# Patient Record
Sex: MALE | Race: OTHER | NOT HISPANIC OR LATINO | URBAN - METROPOLITAN AREA
[De-identification: names, ages, dates, MRNs, and addresses within clinical notes are randomized per-mention and may not be internally consistent; named-entity substitution may affect disease eponyms.]

---

## 2018-03-21 ENCOUNTER — EMERGENCY (EMERGENCY)
Facility: HOSPITAL | Age: 58
LOS: 1 days | Discharge: HOME | End: 2018-03-21
Attending: EMERGENCY MEDICINE | Admitting: EMERGENCY MEDICINE
Payer: COMMERCIAL

## 2018-03-21 VITALS
TEMPERATURE: 98 F | OXYGEN SATURATION: 100 % | RESPIRATION RATE: 16 BRPM | HEART RATE: 62 BPM | SYSTOLIC BLOOD PRESSURE: 162 MMHG | DIASTOLIC BLOOD PRESSURE: 99 MMHG

## 2018-03-21 PROCEDURE — 99283 EMERGENCY DEPT VISIT LOW MDM: CPT

## 2018-03-21 RX ORDER — MECLIZINE HCL 12.5 MG
25 TABLET ORAL ONCE
Qty: 0 | Refills: 0 | Status: COMPLETED | OUTPATIENT
Start: 2018-03-21 | End: 2018-03-21

## 2018-03-21 RX ORDER — MECLIZINE HCL 12.5 MG
1 TABLET ORAL
Qty: 15 | Refills: 0 | OUTPATIENT
Start: 2018-03-21 | End: 2018-03-25

## 2018-03-21 RX ORDER — MECLIZINE HCL 12.5 MG
1 TABLET ORAL
Qty: 15 | Refills: 0
Start: 2018-03-21 | End: 2018-03-25

## 2018-03-21 RX ORDER — ONDANSETRON 8 MG/1
8 TABLET, FILM COATED ORAL ONCE
Qty: 0 | Refills: 0 | Status: COMPLETED | OUTPATIENT
Start: 2018-03-21 | End: 2018-03-21

## 2018-03-21 RX ADMIN — ONDANSETRON 8 MILLIGRAM(S): 8 TABLET, FILM COATED ORAL at 21:11

## 2018-03-21 RX ADMIN — Medication 25 MILLIGRAM(S): at 20:50

## 2018-03-21 NOTE — ED PROVIDER NOTE - MEDICAL DECISION MAKING DETAILS
59y/o M with room-spinning dizziness. Plan: Meclizine, Zofran, DC with Meclizine prescription and f/u with outpatient neuro.

## 2018-03-21 NOTE — ED PROVIDER NOTE - OBJECTIVE STATEMENT
59y/o M with PMHx of psoriasis presents to the ED s/p 5th episode of acute onset severe dizziness, described as room-spinning with nausea. His sx improve with lying down, worsen with movement of head. He previously had spontaneous resolution, but his sx persisted today. Denies vomiting, diarrhea, chest pain, abdominal pain, saddle anesthesia, neurological deficits, any other complaints. NKDA.

## 2018-03-21 NOTE — ED ADULT TRIAGE NOTE - CHIEF COMPLAINT QUOTE
pt arrives w/ c/o dizziness and vomiting. pt states when he stands up the room spins but when he sits back down it resolves. pt denies any chest pain/sob/fever/chills. pt pmh psoriasis.

## 2018-03-21 NOTE — ED ADULT NURSE NOTE - OBJECTIVE STATEMENT
pt received to room 15, AAOx3, c/o dizziness with positional changes and nausea when the dizziness occurs. pt dizziness is self resolved when laying/sitting back down. pt denies any pain or other complaints. appears comfortable, ambulatory with steady gait. PMH- Psoriasis. VS as noted, pt in NAD, medicated per MD orders, will continue to monitor pt.

## 2018-03-21 NOTE — ED PROVIDER NOTE - CRANIAL NERVE AND PUPILLARY EXAM
cranial nerves 2-12 intact/+Horizontal nystagmus. -Romberg's sign, normal heel-to-shin, normal sterotactic

## 2018-03-21 NOTE — ED PROVIDER NOTE - CHPI ED SYMPTOMS NEG
no numbness/no vomiting/no weakness/no diarrhea, no chest pain, no abdominal pain, no saddle anesthesia

## 2018-04-02 ENCOUNTER — EMERGENCY (EMERGENCY)
Facility: HOSPITAL | Age: 58
LOS: 1 days | Discharge: HOME | End: 2018-04-02
Attending: EMERGENCY MEDICINE | Admitting: EMERGENCY MEDICINE
Payer: COMMERCIAL

## 2018-04-02 VITALS
SYSTOLIC BLOOD PRESSURE: 159 MMHG | OXYGEN SATURATION: 99 % | DIASTOLIC BLOOD PRESSURE: 78 MMHG | HEART RATE: 58 BPM | RESPIRATION RATE: 16 BRPM

## 2018-04-02 VITALS
DIASTOLIC BLOOD PRESSURE: 95 MMHG | TEMPERATURE: 98 F | RESPIRATION RATE: 16 BRPM | SYSTOLIC BLOOD PRESSURE: 140 MMHG | OXYGEN SATURATION: 100 % | HEART RATE: 65 BPM

## 2018-04-02 PROBLEM — L40.9 PSORIASIS, UNSPECIFIED: Chronic | Status: ACTIVE | Noted: 2018-03-21

## 2018-04-02 LAB
ALBUMIN SERPL ELPH-MCNC: 4 G/DL — SIGNIFICANT CHANGE UP (ref 3.3–5)
ALP SERPL-CCNC: 69 U/L — SIGNIFICANT CHANGE UP (ref 40–120)
ALT FLD-CCNC: 78 U/L — HIGH (ref 4–41)
AST SERPL-CCNC: 38 U/L — SIGNIFICANT CHANGE UP (ref 4–40)
BASOPHILS # BLD AUTO: 0.09 K/UL — SIGNIFICANT CHANGE UP (ref 0–0.2)
BASOPHILS NFR BLD AUTO: 0.9 % — SIGNIFICANT CHANGE UP (ref 0–2)
BILIRUB SERPL-MCNC: 0.2 MG/DL — SIGNIFICANT CHANGE UP (ref 0.2–1.2)
BUN SERPL-MCNC: 15 MG/DL — SIGNIFICANT CHANGE UP (ref 7–23)
CALCIUM SERPL-MCNC: 8.8 MG/DL — SIGNIFICANT CHANGE UP (ref 8.4–10.5)
CHLORIDE SERPL-SCNC: 101 MMOL/L — SIGNIFICANT CHANGE UP (ref 98–107)
CO2 SERPL-SCNC: 25 MMOL/L — SIGNIFICANT CHANGE UP (ref 22–31)
CREAT SERPL-MCNC: 0.95 MG/DL — SIGNIFICANT CHANGE UP (ref 0.5–1.3)
EOSINOPHIL # BLD AUTO: 0.3 K/UL — SIGNIFICANT CHANGE UP (ref 0–0.5)
EOSINOPHIL NFR BLD AUTO: 3.1 % — SIGNIFICANT CHANGE UP (ref 0–6)
GLUCOSE SERPL-MCNC: 111 MG/DL — HIGH (ref 70–99)
HCT VFR BLD CALC: 48.2 % — SIGNIFICANT CHANGE UP (ref 39–50)
HGB BLD-MCNC: 15.5 G/DL — SIGNIFICANT CHANGE UP (ref 13–17)
IMM GRANULOCYTES # BLD AUTO: 0.02 # — SIGNIFICANT CHANGE UP
IMM GRANULOCYTES NFR BLD AUTO: 0.2 % — SIGNIFICANT CHANGE UP (ref 0–1.5)
LYMPHOCYTES # BLD AUTO: 2.19 K/UL — SIGNIFICANT CHANGE UP (ref 1–3.3)
LYMPHOCYTES # BLD AUTO: 22.5 % — SIGNIFICANT CHANGE UP (ref 13–44)
MCHC RBC-ENTMCNC: 26.6 PG — LOW (ref 27–34)
MCHC RBC-ENTMCNC: 32.2 % — SIGNIFICANT CHANGE UP (ref 32–36)
MCV RBC AUTO: 82.7 FL — SIGNIFICANT CHANGE UP (ref 80–100)
MONOCYTES # BLD AUTO: 0.56 K/UL — SIGNIFICANT CHANGE UP (ref 0–0.9)
MONOCYTES NFR BLD AUTO: 5.8 % — SIGNIFICANT CHANGE UP (ref 2–14)
NEUTROPHILS # BLD AUTO: 6.57 K/UL — SIGNIFICANT CHANGE UP (ref 1.8–7.4)
NEUTROPHILS NFR BLD AUTO: 67.5 % — SIGNIFICANT CHANGE UP (ref 43–77)
NRBC # FLD: 0 — SIGNIFICANT CHANGE UP
PLATELET # BLD AUTO: 201 K/UL — SIGNIFICANT CHANGE UP (ref 150–400)
PMV BLD: 12.3 FL — SIGNIFICANT CHANGE UP (ref 7–13)
POTASSIUM SERPL-MCNC: 4.6 MMOL/L — SIGNIFICANT CHANGE UP (ref 3.5–5.3)
POTASSIUM SERPL-SCNC: 4.6 MMOL/L — SIGNIFICANT CHANGE UP (ref 3.5–5.3)
PROT SERPL-MCNC: 6.8 G/DL — SIGNIFICANT CHANGE UP (ref 6–8.3)
RBC # BLD: 5.83 M/UL — HIGH (ref 4.2–5.8)
RBC # FLD: 14.2 % — SIGNIFICANT CHANGE UP (ref 10.3–14.5)
SODIUM SERPL-SCNC: 136 MMOL/L — SIGNIFICANT CHANGE UP (ref 135–145)
WBC # BLD: 9.73 K/UL — SIGNIFICANT CHANGE UP (ref 3.8–10.5)
WBC # FLD AUTO: 9.73 K/UL — SIGNIFICANT CHANGE UP (ref 3.8–10.5)

## 2018-04-02 PROCEDURE — 99284 EMERGENCY DEPT VISIT MOD MDM: CPT | Mod: 25

## 2018-04-02 RX ORDER — MECLIZINE HCL 12.5 MG
25 TABLET ORAL ONCE
Qty: 0 | Refills: 0 | Status: COMPLETED | OUTPATIENT
Start: 2018-04-02 | End: 2018-04-02

## 2018-04-02 RX ORDER — SODIUM CHLORIDE 9 MG/ML
1000 INJECTION INTRAMUSCULAR; INTRAVENOUS; SUBCUTANEOUS ONCE
Qty: 0 | Refills: 0 | Status: COMPLETED | OUTPATIENT
Start: 2018-04-02 | End: 2018-04-02

## 2018-04-02 RX ORDER — MECLIZINE HCL 12.5 MG
1 TABLET ORAL
Qty: 15 | Refills: 0
Start: 2018-04-02 | End: 2018-04-06

## 2018-04-02 RX ADMIN — Medication 25 MILLIGRAM(S): at 08:48

## 2018-04-02 RX ADMIN — SODIUM CHLORIDE 1000 MILLILITER(S): 9 INJECTION INTRAMUSCULAR; INTRAVENOUS; SUBCUTANEOUS at 08:48

## 2018-04-02 NOTE — ED ADULT NURSE NOTE - OBJECTIVE STATEMENT
Patient awake and alert, c/o dizziness, worse when he stands up, feels like the room is spinning. Sinus deirdre on the cardiac monitor, no c/o chest pain, no sob. Awaiting MD evaluation.

## 2018-04-02 NOTE — ED PROVIDER NOTE - ATTENDING CONTRIBUTION TO CARE
Cornel DIALLO- 57 Y/O M with h/o psoriasis, recently diagnosed bpv p/w recurrent vertigo which started 1 wk ago and ran out of meclizine and his symptoms recurred. Pt states that vertigo is worse when he stands and has nausea but no vomiting, tinnitus, fever, chills, diarrhea, chest pain, photophobia, focal weakness, numbness or visual changes.Pt complained of midl left sided neck strain    Pt is alert, well appearing male, s1s2 normal reg, b/l clear breath sounds, abd soft, nt, nd, normal bowel sounds, neuro exam aox3, cn 2-12 intact, skin warm, dry, good turgor , neck soft supple, nt, good rom at all joints    Plan to check labs, hydrate, give meclizine and valium

## 2018-04-02 NOTE — ED ADULT NURSE NOTE - CHIEF COMPLAINT QUOTE
Pt arrives to ED c/o dizziness and weakness.  Pt was seen 3/21/18 in [facility] ED for similar complaint and was dx with vertigo.  Pt reports he finished his vertigo medication and does not have a refill.  EKG in triage.

## 2018-04-02 NOTE — ED ADULT TRIAGE NOTE - CHIEF COMPLAINT QUOTE
Pt arrives to ED c/o dizziness and weakness.  Pt was seen 3/21/18 in [facility] ED for similar complaint and was dx with vertigo.  Pt reports he finished his vertigo medication and does not have a refill.  EKG in triage.

## 2018-04-02 NOTE — ED PROVIDER NOTE - MEDICAL DECISION MAKING DETAILS
dizziness likely positional vertigo, will check labs, ivf, symptomatic control, reassess, dc with neuro f/u

## 2018-04-02 NOTE — ED PROVIDER NOTE - OBJECTIVE STATEMENT
57 y/o M with PMH of psoriasis p/w dizziness x 1.5 weeks. Pt reports symptoms worsening with positional changes, improved with meclizine. Pt denies fevers, chills, headache, ear pain, numbness, weakness, vision changes. Pt unable to follow up with neurologist. Pt states that he ran out of the meclizine rx.

## 2020-01-17 VITALS
RESPIRATION RATE: 18 BRPM | WEIGHT: 173.94 LBS | SYSTOLIC BLOOD PRESSURE: 144 MMHG | DIASTOLIC BLOOD PRESSURE: 85 MMHG | HEIGHT: 65 IN | HEART RATE: 54 BPM | OXYGEN SATURATION: 99 % | TEMPERATURE: 97 F

## 2020-01-17 NOTE — H&P ADULT - NSHPSOCIALHISTORY_GEN_ALL_CORE
Former smoker x 46 years, 1ppd quit 2 years ago Former smoker x 46 years, 1ppd quit 2 years ago  Current daily marijuana user  Denies illicit drug use

## 2020-01-17 NOTE — H&P ADULT - RS GEN PE MLT RESP DETAILS PC
respirations non-labored/good air movement/clear to auscultation bilaterally/breath sounds equal/airway patent/normal

## 2020-01-17 NOTE — H&P ADULT - NSICDXPASTMEDICALHX_GEN_ALL_CORE_FT
PAST MEDICAL HISTORY:  Coronary artery disease     History of ST elevation myocardial infarction (STEMI)     Hypertension     Psoriasis PAST MEDICAL HISTORY:  Coronary artery disease     History of ST elevation myocardial infarction (STEMI) 2018    Hypertension     Psoriasis

## 2020-01-17 NOTE — H&P ADULT - BMI (KG/M2)
Consent was obtained from the patient. The risks and benefits to therapy were discussed in detail. Specifically, the risks of infection, scarring, bleeding, prolonged wound healing, incomplete removal, allergy to anesthesia, nerve injury and recurrence were addressed. Prior to the procedure, the treatment site was clearly identified and confirmed by the patient. All components of Universal Protocol/PAUSE Rule completed. Post-Care Instructions: I reviewed with the patient in detail post-care instructions. Patient is to keep the biopsy site dry overnight, and then apply bacitracin twice daily until healed. Patient may apply hydrogen peroxide soaks to remove any crusting. Wound Care: Vaseline Lab: [id number] Size Of Lesion In Cm (Required): 0.4 Anesthesia Volume In Cc: 0.5 Bill 64828 For Specimen Handling/Conveyance To Laboratory?: no Medical Necessity Information: It is in your best interest to select a reason for this procedure from the list below. All of these items fulfill various CMS LCD requirements except the new and changing color options. X Size Of Lesion In Cm (Optional): 0 Lab Facility: [id number] Biopsy Method: Dermablade Detail Level: Detailed Hemostasis: Aluminum Chloride Notification Instructions: Patient will be notified of biopsy results. However, patient instructed to call the office if not contacted within 2 weeks. Billing Type: Third-Party Bill Anesthesia Type: 1% lidocaine without epinephrine Path Notes (To The Dermatopathologist): Please check margins. 24-Dec-2018 28.9

## 2020-01-17 NOTE — H&P ADULT - NSICDXFAMILYHX_GEN_ALL_CORE_FT
FAMILY HISTORY:  FH: coronary artery disease, father FAMILY HISTORY:  FH: coronary artery disease, father and mother

## 2020-01-17 NOTE — H&P ADULT - ASSESSMENT
58 y/o current marijuana user and former smoker male with a FHx of CAD (father and mother) with PMHx of psoriasis, known CAD with [place] [facility] [place] [facility] [place]/p PCI RCA with residual 100% LAD ISR stenosis with left to left and right to left collaterals for which patient underwent Nuclear Viability Study (8/26/2019) revealing mildly decreased uptake in the anteroseptal region both at rest and stress with no significant improvement but had more than 50% uptake at baseline suggestive of a viability now presents for staged PCI of LAD lesion today given patient's known viability in LAD territory and  persistent CCS Angina Class III Symptoms.     -EF 30-35% by Echo (8/2019); euvolemic one exam; NS @ 50 cc/hour initaited  -Hx of DM: ISS ordered  -Remains compliant with ASA 81 mg daily and Plavix 75 mg daily; took both this AM; will give additional  mg x one dose.   -Lab values reviewed and remain stable.     Risks & benefits of procedure and alternative therapy have been explained to the patient including but not limited to: allergic reaction, bleeding w/possible need for blood transfusion, infection, renal and vascular compromise, limb damage, arrhythmia, stroke, vessel dissection/perforation, Myocardial infarction, emergent CABG. Informed consent obtained and in chart. 58 y/o current marijuana user and former smoker male with a FHx of CAD (father and mother) with PMHx of HTN, HLD, ICM (EF 30-35% by Echo 8/2019), psoriasis,  known CAD with prior STEMI s/p PCI LAD 5/1/2018 @ [facility] [place] followed by STEMI 8/25/2019 @ [facility] [place] s/p PCI RCA with residual 100% LAD ISR stenosis with left to left and right to left collaterals for which patient underwent Nuclear Viability Study (8/26/2019) revealing mildly decreased uptake in the anteroseptal region both at rest and stress with no significant improvement but had more than 50% uptake at baseline suggestive of a viability now presents for staged PCI of LAD lesion today given patient's known viability in LAD territory and  persistent CCS Angina Class III Symptoms.     -EF 30-35% by Echo (8/2019); euvolemic one exam; NS @ 50 cc/hour initaited  -Hx of DM: ISS ordered  -Remains compliant with ASA 81 mg daily and Plavix 75 mg daily; took both this AM; will give additional  mg x one dose.   -Lab values reviewed and remain stable.     Risks & benefits of procedure and alternative therapy have been explained to the patient including but not limited to: allergic reaction, bleeding w/possible need for blood transfusion, infection, renal and vascular compromise, limb damage, arrhythmia, stroke, vessel dissection/perforation, Myocardial infarction, emergent CABG. Informed consent obtained and in chart. 58 y/o current marijuana user and former smoker male with a FHx of CAD (father and mother) with PMHx of HTN, HLD, ICM (EF 30-35% by Echo 8/2019), psoriasis,  known CAD with prior STEM[place] [facility] [place] [facility] [place]p [place] RCA with residual 100% LAD ISR stenosis with left to left and right to left collaterals for which patient underwent Nuclear Viability Study (8/26/2019) revealing mildly decreased uptake in the anteroseptal region both at rest and stress with no significant improvement but had more than 50% uptake at baseline suggestive of a viability now presents for staged PCI of LAD lesion today given patient's known viability in LAD territory and  persistent CCS Angina Class III Symptoms.     -EF 30-35% by Echo (8/2019); euvolemic one exam; NS @ 50 cc/hour initaited  -Hx of DM: ISS ordered  -Remains compliant with ASA 81 mg daily and Plavix 75 mg daily; took both this AM; will give additional  mg x one dose.   -Of note: Patient has been prescribed Atorvastatin 80 mg in past; but he self discontinued due to severe foot cramps; Per Dr. Ceja initiate Atorvastatin 40 mg post cath.     Risks & benefits of procedure and alternative therapy have been explained to the patient including but not limited to: allergic reaction, bleeding w/possible need for blood transfusion, infection, renal and vascular compromise, limb damage, arrhythmia, stroke, vessel dissection/perforation, Myocardial infarction, emergent CABG. Informed consent obtained and in chart. 60 y/o current marijuana user and former smoker male with a FHx of CAD (father and mother) with PMHx of HTN, HLD, ICM (EF 30-35% by Echo 8/2019), psoriasis,  known CAD with prior STEMI s/p PCI LAD 5/1/2018 @ [facility] [place] followed by STEMI 8/25/2019 @ [facility] [place] s/p PCI RCA with residual 100% LAD ISR stenosis with left to left and right to left collaterals for which patient underwent Nuclear Viability Study (8/26/2019) revealing mildly decreased uptake in the anteroseptal region both at rest and stress with no significant improvement but had more than 50% uptake at baseline suggestive of a viability now presents for staged PCI of LAD lesion today given patient's known viability and ischemia in LAD territory and  persistent CCS Angina Class III Symptoms.     -EF 30-35% by Echo (8/2019); euvolemic one exam; NS @ 50 cc/hour initaited  -Hx of DM: ISS ordered  -Remains compliant with ASA 81 mg daily and Plavix 75 mg daily; took both this AM; will give additional  mg x one dose.   -Of note: Patient has been prescribed Atorvastatin 80 mg in past; but he self discontinued due to severe foot cramps; Per Dr. Ceja initiate Atorvastatin 40 mg post cath.     Risks & benefits of procedure and alternative therapy have been explained to the patient including but not limited to: allergic reaction, bleeding w/possible need for blood transfusion, infection, renal and vascular compromise, limb damage, arrhythmia, stroke, vessel dissection/perforation, Myocardial infarction, emergent CABG. Informed consent obtained and in chart.

## 2020-01-17 NOTE — H&P ADULT - HISTORY OF PRESENT ILLNESS
Patient is a 60 y/o former smoker male FHx of CAD (father) with PMHx of known CAD/STEMI (s/p stent placements x2), HTN who presented to cardiologist Dr. Ceja    Patient denies CP, SOB, palpitations, syncope, dizziness, PND/orthopnea or LE edema.    Echo 08/26/2019: EF 30-35%, there were regional wall abnormalities involving the septum, apex, and distal inferior walls, mild MR, trace TR. NST 08/26/2019: small myocardial perfusion defect noted at rest. Mildly decreased uptake in the anteroseptal region both at rest and stress with no significant improvement but had more than 50% uptake at baseline suggestive of a viability.     In light of pt's risk factors, known CAD, CCS --- anginal symptoms, patient presents for cardiac catheterization of known  of LAD. Hx obtained from wife Shelly Chauhan  [facility]    Patient is a 60 y/o current marijuana user and former smoker male FHx of CAD (father) with PMHx of known CAD/STEMI (s/p multiple PCIs most recently 08/25/2019 BRIANNA of dRCA and BRIANNA mRCA), HTN, HLD who presented to cardiologist Dr. Ceja c/o ANSARI upon walking 2-3 blocks associated with LE claudication improved with rest. In addition patient endorses increased fatigue. He denies CP, palpitations, syncope, dizziness, PND/orthopnea or LE edema. Patient reports compliance with DAPT therapy. Echo 08/26/2019: EF 30-35%, there were regional wall abnormalities involving the septum, apex, and distal inferior walls, mild MR, trace TR. NST 08/26/2019: small myocardial perfusion defect noted at rest. Mildly decreased uptake in the anteroseptal region both at rest and stress with no significant improvement but had more than 50% uptake at baseline suggestive of a viability.     In light of pt's risk factors, known CAD, CCS III anginal symptoms, patient presents for cardiac catheterization of known  of LAD.    Cardiac cath Hx:    Cardiac Cath 08/25/2019: EF 40%, mLAD 100% stenosis ISR, OM1 50% stenosis, mRCA 75% stenosis (received BRIANNA), dRCA 99% stenosis (received BRIANNA)    Cardiac Cath 05/01/2018: received PTCA/BRIANNA of mLAD, EF 45% Hx obtained from wife Shelly Chauhan reliable historian  Confirm meds    Patient is a 58 y/o current marijuana user and former smoker male FHx of CAD (father and mother) with PMHx of known CAD/STEMI (s/p multiple PCIs most recently 08/25/2019 BRIANNA of dRCA and BRIANNA mRCA), HTN, HLD who presented to cardiologist Dr. Ceja c/o ANSARI upon walking 2-3 blocks associated with LE claudication improved with rest. In addition patient endorses increased fatigue. He denies CP, palpitations, syncope, dizziness, PND/orthopnea or LE edema. Patient reports compliance with DAPT therapy. Echo 08/26/2019: EF 30-35%, there were regional wall abnormalities involving the septum, apex, and distal inferior walls, mild MR, trace TR. NST 08/26/2019: small myocardial perfusion defect noted at rest. Mildly decreased uptake in the anteroseptal region both at rest and stress with no significant improvement but had more than 50% uptake at baseline suggestive of a viability.     In light of pt's risk factors, known CAD, CCS III anginal symptoms, patient presents for cardiac catheterization of known  of LAD.    Cardiac cath Hx:    Cardiac Cath 08/25/2019: EF 40%, mLAD 100% stenosis ISR, OM1 50% stenosis, mRCA 75% stenosis (received BRIANNA), dRCA 99% stenosis (received BRIANNA)    Cardiac Cath 05/01/2018: received PTCA/BRIANNA of mLAD, EF 45% 58 y/o current marijuana user and former smoker male with a FHx of CAD (father and mother) with PMHx of psoriasis, known CAD with prior STEM[place] [facility] [place] [facility] [place] RCA with residual 100% LAD ISR stenosis with left to left and right to left collaterals for which patient underwent Nuclear Viability Study (8/26/2019) revealing mildly decreased uptake in the anteroseptal region both at rest and stress with no significant improvement but had more than 50% uptake at baseline suggestive of a viability now presents for staged PCI of LAD lesion today. Patient endorses since last PCI, he experiences ANSARI associated with left sided chest discomfort and cramping in bilateral calves when ambulating more than 2-3 city blocks relieved with rest. He denies any PND, orthopnea, dizziness, syncope, cough, N/V, palpitation or recent ER/Hospitalizations. He remains compliant with ASA/Plavix regimen. Prior Echo 08/26/2019 revealed EF 30-35%, regional wall abnormalities involving the septum, apex, and distal inferior walls, mild MR, trace TR.  In light of pt's risk factors, known CAD with residual LAD disease and CCS Angina Class III Symptoms, patient presents for staged PCI of LAD .       Cardiac cath Hx:    Cardiac Cath 08/25/2019: EF 40%, mLAD 100% stenosis ISR, OM1 50% stenosis, mRCA 75% stenosis (received BRIANNA), dRCA 99% stenosis (received BRIANNA)    Cardiac Cath 05/01/2018: received PTCA/BRIANNA of mLAD, EF 45% 58 y/o current marijuana user and former smoker male with a FHx of CAD (father and mother) with PMHx of HTN, HLD, ICM (EF 30-35% by Echo 8/2019), psoriasis, known CAD with prior STEMI s/p PCI LAD 5/1/2018 @ [facility] [place] followed by STEMI 8/25/2019 @ [facility] [place] s/p PCI RCA with residual 100% LAD ISR stenosis with left to left and right to left collaterals for which patient underwent Nuclear Viability Study (8/26/2019) revealing mildly decreased uptake in the anteroseptal region both at rest and stress with no significant improvement but had more than 50% uptake at baseline suggestive of a viability now presents for staged PCI of LAD lesion today. Patient endorses since last PCI, he experiences ANSARI associated with left sided chest discomfort and cramping in bilateral calves when ambulating more than 2-3 city blocks relieved with rest. He denies any PND, orthopnea, dizziness, syncope, cough, N/V, palpitation or recent ER/Hospitalizations. He remains compliant with ASA/Plavix regimen. Prior Echo 08/26/2019 revealed EF 30-35%, regional wall abnormalities involving the septum, apex, and distal inferior walls, mild MR, trace TR.  In light of pt's risk factors, known CAD with residual LAD disease and CCS Angina Class III Symptoms, patient presents for staged PCI of LAD .       Cardiac cath Hx:    Cardiac Cath 08/25/2019: EF 40%, mLAD 100% stenosis ISR, OM1 50% stenosis, mRCA 75% stenosis (received BRIANNA), dRCA 99% stenosis (received BRIANNA)    Cardiac Cath 05/01/2018: received PTCA/BRIANNA of mLAD, EF 45% 58 y/o current marijuana user and former smoker male with a FHx of CAD (father and mother) with PMHx of HTN, HLD, ICM (EF 30-35% by Echo 8/2019), psoriasis, known CAD with prior STEMI s/p PCI LAD 5/1/2018 [place] [facility] [place] 8/25/2019 [place] [facility] [place]/p PCI RCA with residual 100% LAD ISR stenosis with left to left and right to left collaterals for which patient underwent Nuclear Viability Study (8/26/2019) revealing mildly decreased uptake in the anteroseptal region both at rest and stress with no significant improvement but had more than 50% uptake at baseline suggestive of a viability now presents for staged PCI of LAD lesion today. Patient endorses since last PCI, he experiences ANSARI associated with left sided chest discomfort and cramping in bilateral calves when ambulating more than 2-3 city blocks relieved with rest. He denies any PND, orthopnea, dizziness, syncope, cough, N/V, palpitation or recent ER/Hospitalizations. He remains compliant with ASA/Plavix regimen. Prior Echo 08/26/2019 revealed EF 30-35%, regional wall abnormalities involving the septum, apex, and distal inferior walls, mild MR, trace TR.  In light of pt's risk factors, known CAD with residual LAD disease and CCS Angina Class III Symptoms, positive stress test for ischemia and viability patient presents for staged PCI of LAD .       Cardiac cath Hx:    Cardiac Cath 08/25/2019: EF 40%, mLAD 100% stenosis ISR, OM1 50% stenosis, mRCA 75% stenosis (received BRIANNA), dRCA 99% stenosis (received BRIANNA)    Cardiac Cath 05/01/2018: received PTCA/BIRANNA of mLAD, EF 45%

## 2020-01-21 ENCOUNTER — INPATIENT (INPATIENT)
Facility: HOSPITAL | Age: 60
LOS: 0 days | Discharge: HOME | DRG: 247 | End: 2020-01-22
Attending: INTERNAL MEDICINE | Admitting: INTERNAL MEDICINE
Payer: COMMERCIAL

## 2020-01-21 LAB
ALBUMIN SERPL ELPH-MCNC: 4.6 G/DL — SIGNIFICANT CHANGE UP (ref 3.3–5)
ALP SERPL-CCNC: 156 U/L — HIGH (ref 40–120)
ALT FLD-CCNC: 131 U/L — HIGH (ref 10–45)
ANION GAP SERPL CALC-SCNC: 12 MMOL/L — SIGNIFICANT CHANGE UP (ref 5–17)
APTT BLD: 32.2 SEC — SIGNIFICANT CHANGE UP (ref 27.5–36.3)
AST SERPL-CCNC: 38 U/L — SIGNIFICANT CHANGE UP (ref 10–40)
BASOPHILS # BLD AUTO: 0.07 K/UL — SIGNIFICANT CHANGE UP (ref 0–0.2)
BASOPHILS NFR BLD AUTO: 0.8 % — SIGNIFICANT CHANGE UP (ref 0–2)
BILIRUB SERPL-MCNC: 0.3 MG/DL — SIGNIFICANT CHANGE UP (ref 0.2–1.2)
BUN SERPL-MCNC: 15 MG/DL — SIGNIFICANT CHANGE UP (ref 7–23)
CALCIUM SERPL-MCNC: 9.6 MG/DL — SIGNIFICANT CHANGE UP (ref 8.4–10.5)
CHLORIDE SERPL-SCNC: 104 MMOL/L — SIGNIFICANT CHANGE UP (ref 96–108)
CHOLEST SERPL-MCNC: 160 MG/DL — SIGNIFICANT CHANGE UP (ref 10–199)
CK MB CFR SERPL CALC: 1.6 NG/ML — SIGNIFICANT CHANGE UP (ref 0–6.7)
CK SERPL-CCNC: 66 U/L — SIGNIFICANT CHANGE UP (ref 30–200)
CO2 SERPL-SCNC: 24 MMOL/L — SIGNIFICANT CHANGE UP (ref 22–31)
CREAT SERPL-MCNC: 1.04 MG/DL — SIGNIFICANT CHANGE UP (ref 0.5–1.3)
CRP SERPL-MCNC: 0.25 MG/DL — SIGNIFICANT CHANGE UP (ref 0–0.4)
EOSINOPHIL # BLD AUTO: 0.32 K/UL — SIGNIFICANT CHANGE UP (ref 0–0.5)
EOSINOPHIL NFR BLD AUTO: 3.8 % — SIGNIFICANT CHANGE UP (ref 0–6)
GLUCOSE BLDC GLUCOMTR-MCNC: 103 MG/DL — HIGH (ref 70–99)
GLUCOSE BLDC GLUCOMTR-MCNC: 97 MG/DL — SIGNIFICANT CHANGE UP (ref 70–99)
GLUCOSE BLDC GLUCOMTR-MCNC: 99 MG/DL — SIGNIFICANT CHANGE UP (ref 70–99)
GLUCOSE SERPL-MCNC: 105 MG/DL — HIGH (ref 70–99)
HBA1C BLD-MCNC: 6.1 % — HIGH (ref 4–5.6)
HCT VFR BLD CALC: 48.1 % — SIGNIFICANT CHANGE UP (ref 39–50)
HDLC SERPL-MCNC: 33 MG/DL — LOW
HGB BLD-MCNC: 14.6 G/DL — SIGNIFICANT CHANGE UP (ref 13–17)
IMM GRANULOCYTES NFR BLD AUTO: 0.4 % — SIGNIFICANT CHANGE UP (ref 0–1.5)
INR BLD: 1.01 — SIGNIFICANT CHANGE UP (ref 0.88–1.16)
LIPID PNL WITH DIRECT LDL SERPL: 89 MG/DL — SIGNIFICANT CHANGE UP
LYMPHOCYTES # BLD AUTO: 1.54 K/UL — SIGNIFICANT CHANGE UP (ref 1–3.3)
LYMPHOCYTES # BLD AUTO: 18.1 % — SIGNIFICANT CHANGE UP (ref 13–44)
MCHC RBC-ENTMCNC: 26.4 PG — LOW (ref 27–34)
MCHC RBC-ENTMCNC: 30.4 GM/DL — LOW (ref 32–36)
MCV RBC AUTO: 87 FL — SIGNIFICANT CHANGE UP (ref 80–100)
MONOCYTES # BLD AUTO: 0.6 K/UL — SIGNIFICANT CHANGE UP (ref 0–0.9)
MONOCYTES NFR BLD AUTO: 7.1 % — SIGNIFICANT CHANGE UP (ref 2–14)
NEUTROPHILS # BLD AUTO: 5.95 K/UL — SIGNIFICANT CHANGE UP (ref 1.8–7.4)
NEUTROPHILS NFR BLD AUTO: 69.8 % — SIGNIFICANT CHANGE UP (ref 43–77)
NRBC # BLD: 0 /100 WBCS — SIGNIFICANT CHANGE UP (ref 0–0)
PLATELET # BLD AUTO: 226 K/UL — SIGNIFICANT CHANGE UP (ref 150–400)
POTASSIUM SERPL-MCNC: 4.3 MMOL/L — SIGNIFICANT CHANGE UP (ref 3.5–5.3)
POTASSIUM SERPL-SCNC: 4.3 MMOL/L — SIGNIFICANT CHANGE UP (ref 3.5–5.3)
PROT SERPL-MCNC: 7.7 G/DL — SIGNIFICANT CHANGE UP (ref 6–8.3)
PROTHROM AB SERPL-ACNC: 11.5 SEC — SIGNIFICANT CHANGE UP (ref 10–12.9)
RBC # BLD: 5.53 M/UL — SIGNIFICANT CHANGE UP (ref 4.2–5.8)
RBC # FLD: 13.6 % — SIGNIFICANT CHANGE UP (ref 10.3–14.5)
SODIUM SERPL-SCNC: 140 MMOL/L — SIGNIFICANT CHANGE UP (ref 135–145)
TOTAL CHOLESTEROL/HDL RATIO MEASUREMENT: 4.8 RATIO — SIGNIFICANT CHANGE UP (ref 3.4–9.6)
TRIGL SERPL-MCNC: 190 MG/DL — HIGH (ref 10–149)
WBC # BLD: 8.51 K/UL — SIGNIFICANT CHANGE UP (ref 3.8–10.5)
WBC # FLD AUTO: 8.51 K/UL — SIGNIFICANT CHANGE UP (ref 3.8–10.5)

## 2020-01-21 PROCEDURE — 92928 PRQ TCAT PLMT NTRAC ST 1 LES: CPT | Mod: LD

## 2020-01-21 PROCEDURE — 93454 CORONARY ARTERY ANGIO S&I: CPT | Mod: 26,59

## 2020-01-21 PROCEDURE — 92978 ENDOLUMINL IVUS OCT C 1ST: CPT | Mod: 26,LD

## 2020-01-21 PROCEDURE — 99221 1ST HOSP IP/OBS SF/LOW 40: CPT

## 2020-01-21 PROCEDURE — 93010 ELECTROCARDIOGRAM REPORT: CPT

## 2020-01-21 RX ORDER — PANTOPRAZOLE SODIUM 20 MG/1
40 TABLET, DELAYED RELEASE ORAL
Refills: 0 | Status: DISCONTINUED | OUTPATIENT
Start: 2020-01-22 | End: 2020-01-22

## 2020-01-21 RX ORDER — SODIUM CHLORIDE 9 MG/ML
500 INJECTION INTRAMUSCULAR; INTRAVENOUS; SUBCUTANEOUS
Refills: 0 | Status: DISCONTINUED | OUTPATIENT
Start: 2020-01-21 | End: 2020-01-21

## 2020-01-21 RX ORDER — GLUCAGON INJECTION, SOLUTION 0.5 MG/.1ML
1 INJECTION, SOLUTION SUBCUTANEOUS ONCE
Refills: 0 | Status: DISCONTINUED | OUTPATIENT
Start: 2020-01-21 | End: 2020-01-22

## 2020-01-21 RX ORDER — PANTOPRAZOLE SODIUM 20 MG/1
1 TABLET, DELAYED RELEASE ORAL
Qty: 0 | Refills: 0 | DISCHARGE

## 2020-01-21 RX ORDER — DEXTROSE 50 % IN WATER 50 %
25 SYRINGE (ML) INTRAVENOUS ONCE
Refills: 0 | Status: DISCONTINUED | OUTPATIENT
Start: 2020-01-21 | End: 2020-01-22

## 2020-01-21 RX ORDER — METOPROLOL TARTRATE 50 MG
25 TABLET ORAL EVERY 12 HOURS
Refills: 0 | Status: DISCONTINUED | OUTPATIENT
Start: 2020-01-21 | End: 2020-01-22

## 2020-01-21 RX ORDER — LISINOPRIL 2.5 MG/1
5 TABLET ORAL DAILY
Refills: 0 | Status: DISCONTINUED | OUTPATIENT
Start: 2020-01-22 | End: 2020-01-22

## 2020-01-21 RX ORDER — CHLORHEXIDINE GLUCONATE 213 G/1000ML
1 SOLUTION TOPICAL ONCE
Refills: 0 | Status: DISCONTINUED | OUTPATIENT
Start: 2020-01-21 | End: 2020-01-21

## 2020-01-21 RX ORDER — SODIUM CHLORIDE 9 MG/ML
1000 INJECTION, SOLUTION INTRAVENOUS
Refills: 0 | Status: DISCONTINUED | OUTPATIENT
Start: 2020-01-21 | End: 2020-01-22

## 2020-01-21 RX ORDER — METFORMIN HYDROCHLORIDE 850 MG/1
1 TABLET ORAL
Qty: 0 | Refills: 0 | DISCHARGE

## 2020-01-21 RX ORDER — LISINOPRIL 2.5 MG/1
0 TABLET ORAL
Qty: 0 | Refills: 0 | DISCHARGE

## 2020-01-21 RX ORDER — DEXTROSE 50 % IN WATER 50 %
15 SYRINGE (ML) INTRAVENOUS ONCE
Refills: 0 | Status: DISCONTINUED | OUTPATIENT
Start: 2020-01-21 | End: 2020-01-22

## 2020-01-21 RX ORDER — ASPIRIN/CALCIUM CARB/MAGNESIUM 324 MG
243 TABLET ORAL ONCE
Refills: 0 | Status: DISCONTINUED | OUTPATIENT
Start: 2020-01-21 | End: 2020-01-21

## 2020-01-21 RX ORDER — ATORVASTATIN CALCIUM 80 MG/1
1 TABLET, FILM COATED ORAL
Qty: 0 | Refills: 0 | DISCHARGE

## 2020-01-21 RX ORDER — ATORVASTATIN CALCIUM 80 MG/1
40 TABLET, FILM COATED ORAL AT BEDTIME
Refills: 0 | Status: DISCONTINUED | OUTPATIENT
Start: 2020-01-21 | End: 2020-01-22

## 2020-01-21 RX ORDER — INSULIN LISPRO 100/ML
VIAL (ML) SUBCUTANEOUS ONCE
Refills: 0 | Status: COMPLETED | OUTPATIENT
Start: 2020-01-21 | End: 2020-01-21

## 2020-01-21 RX ORDER — SODIUM CHLORIDE 9 MG/ML
500 INJECTION INTRAMUSCULAR; INTRAVENOUS; SUBCUTANEOUS
Refills: 0 | Status: DISCONTINUED | OUTPATIENT
Start: 2020-01-21 | End: 2020-01-22

## 2020-01-21 RX ORDER — ASPIRIN/CALCIUM CARB/MAGNESIUM 324 MG
81 TABLET ORAL DAILY
Refills: 0 | Status: DISCONTINUED | OUTPATIENT
Start: 2020-01-22 | End: 2020-01-22

## 2020-01-21 RX ORDER — CLOPIDOGREL BISULFATE 75 MG/1
75 TABLET, FILM COATED ORAL DAILY
Refills: 0 | Status: DISCONTINUED | OUTPATIENT
Start: 2020-01-22 | End: 2020-01-22

## 2020-01-21 RX ORDER — DEXTROSE 50 % IN WATER 50 %
12.5 SYRINGE (ML) INTRAVENOUS ONCE
Refills: 0 | Status: DISCONTINUED | OUTPATIENT
Start: 2020-01-21 | End: 2020-01-22

## 2020-01-21 RX ADMIN — Medication 25 MILLIGRAM(S): at 17:26

## 2020-01-21 RX ADMIN — SODIUM CHLORIDE 50 MILLILITER(S): 9 INJECTION INTRAMUSCULAR; INTRAVENOUS; SUBCUTANEOUS at 12:11

## 2020-01-21 RX ADMIN — ATORVASTATIN CALCIUM 40 MILLIGRAM(S): 80 TABLET, FILM COATED ORAL at 21:56

## 2020-01-21 NOTE — CONSULT NOTE ADULT - SUBJECTIVE AND OBJECTIVE BOX
Cardiologist:  PMD:     CHIEF COMPLAINT: s/p cardiac catheterization requiring cardiovascular prevention optimization and education    HISTORY OF PRESENT ILLNESS:  58 y/o current marijuana user and former smoker male with a FHx of CAD (father and mother) with PMHx of HTN, HLD, ICM (EF 30-35% by Echo 8/2019), psoriasis, known CAD with [place] [facility] [place] [facility] [place] RCA with residual 100% LAD ISR stenosis with left to left and right to left collaterals for which patient underwent Nuclear Viability Study (8/26/2019) revealing mildly decreased uptake in the anteroseptal region both at rest and stress with no significant improvement but had more than 50% uptake at baseline suggestive of a viability now presents for staged PCI of LAD lesion today. Patient endorses since last PCI, he experiences ANSARI associated with left sided chest discomfort and cramping in bilateral calves when ambulating more than 2-3 city blocks relieved with rest. He denies any PND, orthopnea, dizziness, syncope, cough, N/V, palpitation or recent ER/Hospitalizations. He remains compliant with ASA/Plavix regimen. Prior Echo 08/26/2019 revealed EF 30-35%, regional wall abnormalities involving the septum, apex, and distal inferior walls, mild MR, trace TR.  In light of pt's risk factors, known CAD with residual LAD disease and CCS Angina Class III Symptoms, patient presents for staged PCI of LAD .     Cardiac cath Hx:  Cardiac Cath 08/25/2019: EF 40%, mLAD 100% stenosis ISR, OM1 50% stenosis, mRCA 75% stenosis (received BRIANNA), dRCA 99% stenosis (received BRIANNA)  Cardiac Cath 05/01/2018: received PTCA/BRIANNA of mLAD, EF 45% (17 Jan 2020 10:33)    Review of systems otherwise negative.     Lifestyle History:  - Mediterranean Diet Score (9 question survey) was x.   (8-9: optimal, 6-7: near-optimal, 4-5: suboptimal, 0-3: markedly suboptimal)  - Patient reports exercising at a light, moderate and vigorous level for ******, *****, and **** minutes per week respectively.   - Smoking:   - Alcohol:  - Stress:  - Depression:    PAST MEDICAL & SURGICAL HISTORY:  Hypertension  History of ST elevation myocardial infarction (STEMI): 2018  Coronary artery disease  Psoriasis  No significant past surgical history    FAMILY HISTORY:   Patient denies any first degree family history of premature CAD or CVA.     Allergies:   No Known Allergies    HOME MEDICATIONS:  Aspirin Enteric Coated 81 mg oral delayed release tablet: 1 tab(s) orally once a day (21 Jan 2020 10:30)  atorvastatin 40 mg oral tablet: 1 tab(s) orally once a day (21 Jan 2020 10:55)  lisinopril 5 mg oral tablet:  (21 Jan 2020 10:30)  metFORMIN 500 mg oral tablet: 1 tab(s) orally once a day (21 Jan 2020 10:30)  Metoprolol Tartrate 25 mg oral tablet: 1 tab(s) orally 2 times a day (21 Jan 2020 10:30)  pantoprazole 40 mg oral delayed release tablet: 1 tab(s) orally once a day (21 Jan 2020 10:30)  Plavix 75 mg oral tablet: 1 tab(s) orally once a day (21 Jan 2020 10:30)    PHYSICAL EXAM:  Height (cm): 165.1 (01-21 @ 08:00)  Weight (kg): 78.9 (01-21 @ 08:00)  BMI (kg/m2): 28.9 (01-21 @ 08:00)  	  Gen- awake, conversive  Head-NCAT  Eyes- no arcus noted  Neck- no thyromegaly, no cervical LAD, no carotid bruit appreciated  Respiratory- good air entry b/l, no WRR  Cardiovascular- S1S2, RRR, no M/R/G appreciated  Gastrointestinal- soft, NTND, no HSM, no masses  Extremities- symmetric, no LE edema, 2+ DP and radial pulses b/l, no tendon xanthomas  Neurology- moves all extremities, no focal deficits  Psych- normal affect, non-depressed mood  Skin- no lesions, no rashes     LABS:	                      14.6   8.51  )-----------( 226      ( 21 Jan 2020 06:59 )             48.1     01-21    140  |  104  |  15  ----------------------------<  105<H>  4.3   |  24  |  1.04    Ca    9.6      21 Jan 2020 06:59    TPro  7.7  /  Alb  4.6  /  TBili  0.3  /  DBili  x   /  AST  38  /  ALT  131<H>  /  AlkPhos  156<H>  01-21    Hemoglobin A1C, Whole Blood: 6.1 % (01-21 @ 06:59)    Cholesterol, Serum: 160 mg/dL (01-21 @ 06:59)  HDL Cholesterol, Serum: 33 mg/dL (01-21 @ 06:59)  Triglycerides, Serum: 190 mg/dL (01-21 @ 06:59)  Direct LDL: 89 mg/dL (01-21 @ 06:59)    C-Reactive Protein, Serum: 0.25 mg/dL (01-21 @ 06:59)    ASSESSMENT/RECOMMENDATIONS: 	  Patient's dietary, exercise and overall lifestyle habits were reviewed. The concept of atherosclerosis and its systemic nature was discussed with a focus on the need to get all cardiovascular risk factors to goal. At this time, I would like to make the following recommendations to optimize atherosclerotic risk factors.     RECOMMENDATIONS:   Anti-platelet Therapy: DAPT per interventionalist recommendation.   Lipid Therapy: High dose statin therapy would likely benefit this patient.   Hypertension: Blood pressures during this stay were   Mediterranean Diet Score is ****. Some suggestions include ---- having at least 2 cups of vegetables a day, at least 2 whole grains a day, 2 pieces of fruit a day, limiting red meat and processed meat to no more than twice per week, increasing fish intake to at least twice a week, having nuts and seeds on most days.  Exercise: Recommended gradually increasing activity to 30-45 minutes most days of the week once cleared by referring cardiologist. Cardiac rehab might benefit this patient and covered by major insurance plans (other than co-pays), please consider referral.   Medication Adherence: Patient has no issues with adherence at this time.   Smoking: This patient is a non-smoker.   Obesity/Overweight: The patient was advised about specific mechanisms such as reduced portions and increased activity for efforts toward weight loss.   Glucometabolic State:  Sleep Apnea: The patient is at low risk for sleep apnea.   Psychological Stress: The patient appears to be coping with stressors well at this time.     Thank you for the opportunity to see this patient. Please feel free to contact Prevention if there are any questions, or if you feel that your patient would benefit from continued follow-up visits with the Program.    Kamilah Maria MD  Preventive Cardiology Fellow    Princess Muñiz MD  System Director, Cardiovascular Prevention Cardiologist: Dr. Ceja    CHIEF COMPLAINT: s/p cardiac catheterization requiring cardiovascular prevention optimization and education    HISTORY OF PRESENT ILLNESS:  60 y/o current marijuana user and former smoker male with a FHx of CAD (father and mother) with PMHx of HTN, HLD, ICM (EF 30-35% by Echo 8/2019), psoriasis, known CAD with prior STEMI s/p PCI LAD 5/1/2018 @ [facility] [place] followed by STEMI 8/25/2019 @ [facility] [place]/p PCI RCA with residual 100% LAD ISR stenosis with left to left and right to left collaterals for which patient underwent Nuclear Viability Study (8/26/2019) revealing mildly decreased uptake in the anteroseptal region both at rest and stress with no significant improvement but had more than 50% uptake at baseline suggestive of a viability now presents for staged PCI of LAD lesion today. Patient endorses since last PCI, he experiences ANSARI associated with left sided chest discomfort and cramping in bilateral calves when ambulating more than 2-3 city blocks relieved with rest. He denies any PND, orthopnea, dizziness, syncope, cough, N/V, palpitation or recent ER/Hospitalizations. He remains compliant with ASA/Plavix regimen. Prior Echo 08/26/2019 revealed EF 30-35%, regional wall abnormalities involving the septum, apex, and distal inferior walls, mild MR, trace TR.  In light of pt's risk factors, known CAD with residual LAD disease and CCS Angina Class III Symptoms, patient presents for staged PCI of LAD .     Cardiac cath Hx:  Cardiac Cath 08/25/2019: EF 40%, mLAD 100% stenosis ISR, OM1 50% stenosis, mRCA 75% stenosis (received BRIANNA), dRCA 99% stenosis (received BRIANNA)  Cardiac Cath 05/01/2018: received PTCA/BRIANNA of mLAD, EF 45% (17 Jan 2020 10:33)    Review of systems otherwise negative.     Interval HPI:  Patient s/p cath today with BRIANNA of LAD .    Lifestyle History:  - Mediterranean Diet Score (9 question survey) was 6, however endorses eating a lot of sweets    (8-9: optimal, 6-7: near-optimal, 4-5: suboptimal, 0-3: markedly suboptimal)  - Patient reports exercising at a light, moderate and vigorous level for <30 minutes per week respectively.   - Smoking: Former smoker of 1ppd for 45-50yrs until his 1st MI in 2018  - Alcohol: Denied  - Stress: Denied  - Depression: Denied    PAST MEDICAL & SURGICAL HISTORY:  Hypertension  History of ST elevation myocardial infarction (STEMI): 2018  Coronary artery disease  Psoriasis  No significant past surgical history    FAMILY HISTORY:   Father had fatal MI at age 56yrs (was a smoker)    Allergies:   No Known Allergies    HOME MEDICATIONS:  Aspirin Enteric Coated 81 mg oral delayed release tablet: 1 tab(s) orally once a day (21 Jan 2020 10:30)  atorvastatin 40 mg oral tablet: 1 tab(s) orally once a day (21 Jan 2020 10:55)  lisinopril 5 mg oral tablet:  (21 Jan 2020 10:30)  metFORMIN 500 mg oral tablet: 1 tab(s) orally once a day (21 Jan 2020 10:30)  Metoprolol Tartrate 25 mg oral tablet: 1 tab(s) orally 2 times a day (21 Jan 2020 10:30)  pantoprazole 40 mg oral delayed release tablet: 1 tab(s) orally once a day (21 Jan 2020 10:30)  Plavix 75 mg oral tablet: 1 tab(s) orally once a day (21 Jan 2020 10:30)    PHYSICAL EXAM:  Height (cm): 165.1 (01-21 @ 08:00)  Weight (kg): 78.9 (01-21 @ 08:00)  BMI (kg/m2): 28.9 (01-21 @ 08:00)  	  Gen- awake, conversive  Head-NCAT  Neck- no thyromegaly, no cervical LAD, no carotid bruit appreciated  Respiratory- good air entry b/l, no WRR  Cardiovascular- S1S2, RRR, no M/R/G appreciated  Gastrointestinal- soft, NTND, no HSM, no masses  Extremities- symmetric, no LE edema, 2+ DP and radial pulses b/l   Neurology- moves all extremities, no focal deficits  Psych- normal affect, non-depressed mood  Skin- both hands with scaly lesions from psoriasis     LABS:	                      14.6   8.51  )-----------( 226      ( 21 Jan 2020 06:59 )             48.1     01-21    140  |  104  |  15  ----------------------------<  105<H>  4.3   |  24  |  1.04    Ca    9.6      21 Jan 2020 06:59    TPro  7.7  /  Alb  4.6  /  TBili  0.3  /  DBili  x   /  AST  38  /  ALT  131<H>  /  AlkPhos  156<H>  01-21    Hemoglobin A1C, Whole Blood: 6.1 % (01-21 @ 06:59)    Cholesterol, Serum: 160 mg/dL (01-21 @ 06:59)  HDL Cholesterol, Serum: 33 mg/dL (01-21 @ 06:59)  Triglycerides, Serum: 190 mg/dL (01-21 @ 06:59)  Direct LDL: 89 mg/dL (01-21 @ 06:59)    C-Reactive Protein, Serum: 0.25 mg/dL (01-21 @ 06:59)    ASSESSMENT/RECOMMENDATIONS: 	  Patient's dietary, exercise and overall lifestyle habits were reviewed. The concept of atherosclerosis and its systemic nature was discussed with a focus on the need to get all cardiovascular risk factors to goal. At this time, I would like to make the following recommendations to optimize atherosclerotic risk factors.     RECOMMENDATIONS:   Anti-platelet Therapy: DAPT per interventionalist recommendation.   Lipid Therapy: Patient previously tried on atorvastatin 80mg and was unable to tolerate it due to muscle cramps, now on atorvastatin 40mg daily and tolerating it fine. He has been taking atorvastatin 40mg since 08/2019 and LDL is 89mg/dL, recommend to add ezetimibe 10mg to current regimen for further LDL lowering.  Hypertension: Blood pressures during this stay were slightly elevated, however, patient mentions when checked as outpatient is always normal. Continue to monitor and should it remain elevated, can consider increasing lisinopril to 10mg daily.   Mediterranean Diet Score is 6. Some suggestions include having at least 2 cups of vegetables a day, limiting red meat and processed meat to no more than twice per week, and having nuts and seeds on most days. Additionally patient advised to limit his intake of refined carbs (eats cookies and sweets regularly). Patient referred to see nutritionist.   Exercise: Recommended gradually increasing activity to 30-45 minutes most days of the week once cleared by referring cardiologist. Cardiac rehab might benefit this patient and covered by major insurance plans (other than co-pays), please consider referral.  Medication Adherence: Patient has no issues with adherence at this time.   Smoking: This patient is a former cigarette smoker.   Obesity/Overweight: The patient was advised about specific mechanisms such as reduced portions and increased activity for efforts toward weight loss.   Glucometabolic State: Patient denies history of diabetes, however, is taking metformin which could have been initiated for pre-diabetes and to have better glucose control given his ASCVD risk. Currently his HgA1C is 6.1% which would represent well controlled diabetes.   Sleep Apnea: The patient is at low risk for sleep apnea.   Psychological Stress: The patient appears to be coping with stressors well at this time.     Thank you for the opportunity to see this patient. Please feel free to contact Prevention if there are any questions, or if you feel that your patient would benefit from continued follow-up visits with the Program.    Kamilah Maria MD  Preventive Cardiology Fellow    Princess Muñiz MD  System Director, Cardiovascular Prevention Cardiologist: Dr. Ceja    CHIEF COMPLAINT: s/p cardiac catheterization requiring cardiovascular prevention optimization and education    HISTORY OF PRESENT ILLNESS:  58 y/o current marijuana user and former smoker male with a FHx of CAD (father and mother) with PMHx of HTN, HLD, ICM (EF 30-35% by Echo 8/2019), psoriasis, known CAD with prior STEMI s/p PCI LAD 5/1/2018 @ [facility] [place] followed by STEMI 8/25/2019 @ [facility] [place] s/p PCI RCA with residual 100% LAD ISR stenosis with left to left and right to left collaterals for which patient underwent Nuclear Viability Study (8/26/2019) revealing mildly decreased uptake in the anteroseptal region both at rest and stress with no significant improvement but had more than 50% uptake at baseline suggestive of a viability now presents for staged PCI of LAD lesion today. Patient endorses since last PCI, he experiences ANSARI associated with left sided chest discomfort and cramping in bilateral calves when ambulating more than 2-3 city blocks relieved with rest. He denies any PND, orthopnea, dizziness, syncope, cough, N/V, palpitation or recent ER/Hospitalizations. He remains compliant with ASA/Plavix regimen. Prior Echo 08/26/2019 revealed EF 30-35%, regional wall abnormalities involving the septum, apex, and distal inferior walls, mild MR, trace TR.  In light of pt's risk factors, known CAD with residual LAD disease and CCS Angina Class III Symptoms, patient presents for staged PCI of LAD .     Cardiac cath Hx:  Cardiac Cath 08/25/2019: EF 40%, mLAD 100% stenosis ISR, OM1 50% stenosis, mRCA 75% stenosis (received BRIANNA), dRCA 99% stenosis (received BRIANNA)  Cardiac Cath 05/01/2018: received PTCA/BRIANNA of mLAD, EF 45% (17 Jan 2020 10:33)    Review of systems otherwise negative.     Interval HPI:  Patient s/p cath today with BRIANNA of LAD .    Lifestyle History:  - Mediterranean Diet Score (9 question survey) was 6, however endorses eating a lot of sweets    (8-9: optimal, 6-7: near-optimal, 4-5: suboptimal, 0-3: markedly suboptimal)  - Patient reports exercising at a light, moderate and vigorous level for <30 minutes per week respectively.   - Smoking: Former smoker of 1ppd for 45-50yrs until his 1st MI in 2018  - Alcohol: Denied  - Stress: Denied  - Depression: Denied    PAST MEDICAL & SURGICAL HISTORY:  Hypertension  History of ST elevation myocardial infarction (STEMI): 2018  Coronary artery disease  Psoriasis  No significant past surgical history    FAMILY HISTORY:   Father had fatal MI at age 56yrs (was a smoker)    Allergies:   No Known Allergies    HOME MEDICATIONS:  Aspirin Enteric Coated 81 mg oral delayed release tablet: 1 tab(s) orally once a day (21 Jan 2020 10:30)  atorvastatin 40 mg oral tablet: 1 tab(s) orally once a day (21 Jan 2020 10:55)  lisinopril 5 mg oral tablet:  (21 Jan 2020 10:30)  metFORMIN 500 mg oral tablet: 1 tab(s) orally once a day (21 Jan 2020 10:30)  Metoprolol Tartrate 25 mg oral tablet: 1 tab(s) orally 2 times a day (21 Jan 2020 10:30)  pantoprazole 40 mg oral delayed release tablet: 1 tab(s) orally once a day (21 Jan 2020 10:30)  Plavix 75 mg oral tablet: 1 tab(s) orally once a day (21 Jan 2020 10:30)    PHYSICAL EXAM:  Height (cm): 165.1 (01-21 @ 08:00)  Weight (kg): 78.9 (01-21 @ 08:00)  BMI (kg/m2): 28.9 (01-21 @ 08:00)  	  Gen- awake, conversive  Head-NCAT  Neck- no thyromegaly, no cervical LAD, no carotid bruit appreciated  Respiratory- good air entry b/l, no WRR  Cardiovascular- S1S2, RRR, no M/R/G appreciated  Gastrointestinal- soft, NTND, no HSM, no masses  Extremities- symmetric, no LE edema, 2+ DP and radial pulses b/l   Neurology- moves all extremities, no focal deficits  Psych- normal affect, non-depressed mood  Skin- both hands with scaly lesions from psoriasis     LABS:	                      14.6   8.51  )-----------( 226      ( 21 Jan 2020 06:59 )             48.1     01-21    140  |  104  |  15  ----------------------------<  105<H>  4.3   |  24  |  1.04    Ca    9.6      21 Jan 2020 06:59    TPro  7.7  /  Alb  4.6  /  TBili  0.3  /  DBili  x   /  AST  38  /  ALT  131<H>  /  AlkPhos  156<H>  01-21    Hemoglobin A1C, Whole Blood: 6.1 % (01-21 @ 06:59)    Cholesterol, Serum: 160 mg/dL (01-21 @ 06:59)  HDL Cholesterol, Serum: 33 mg/dL (01-21 @ 06:59)  Triglycerides, Serum: 190 mg/dL (01-21 @ 06:59)  Direct LDL: 89 mg/dL (01-21 @ 06:59)    C-Reactive Protein, Serum: 0.25 mg/dL (01-21 @ 06:59)    ASSESSMENT/RECOMMENDATIONS: 	  Patient's dietary, exercise and overall lifestyle habits were reviewed. The concept of atherosclerosis and its systemic nature was discussed with a focus on the need to get all cardiovascular risk factors to goal. At this time, I would like to make the following recommendations to optimize atherosclerotic risk factors.     RECOMMENDATIONS:   Anti-platelet Therapy: DAPT per interventionalist recommendation.   Lipid Therapy: Patient previously tried on atorvastatin 80mg and was unable to tolerate it due to muscle cramps, now on atorvastatin 40mg daily and tolerating it fine. He has been taking atorvastatin 40mg since 08/2019 and LDL is 89mg/dL, recommend to add ezetimibe 10mg to current regimen for further LDL lowering.  Hypertension: Blood pressures during this stay were slightly elevated, however, patient mentions when checked as outpatient is always normal. Continue to monitor.   Mediterranean Diet Score is 6. Some suggestions include having at least 2 cups of vegetables a day, limiting red meat and processed meat to no more than twice per week, and having nuts and seeds on most days. Additionally patient advised to limit his intake of refined carbs (eats cookies and sweets regularly). Patient referred to see nutritionist.   Exercise: Recommended gradually increasing activity to 30-45 minutes most days of the week once cleared by referring cardiologist. Cardiac rehab might benefit this patient and covered by major insurance plans (other than co-pays), please consider referral.  Medication Adherence: Patient has no issues with adherence at this time.   Smoking: This patient is a former cigarette smoker.   Obesity/Overweight: The patient was advised about specific mechanisms such as reduced portions and increased activity for efforts toward weight loss.   Glucometabolic State: Patient denies history of diabetes, however, is taking metformin which could have been initiated for pre-diabetes and to have better glucose control given his ASCVD risk. Currently his HgA1C is 6.1% which would represent well controlled diabetes.   Sleep Apnea: The patient is at low risk for sleep apnea.   Psychological Stress: The patient appears to be coping with stressors well at this time.     Thank you for the opportunity to see this patient. Please feel free to contact Prevention if there are any questions, or if you feel that your patient would benefit from continued follow-up visits with the Program.    Kamilah Maria MD  Preventive Cardiology Fellow    Princess Muñiz MD  System Director, Cardiovascular Prevention

## 2020-01-21 NOTE — PROGRESS NOTE ADULT - SUBJECTIVE AND OBJECTIVE BOX
CORONARY ANGIOGRAPHY  1/21/2020    Indication: ANSARI, UA, hx of CAD, known  LAD-positive viability study    Conclusion  Frailty Index: 4  Syntax Score: Low    1. Coronary Angiography  LM: normal  LAD: proximal 100% , large caliber vessel, 99% stenosis ostial D1 small to medium sized vessel, previously placed mid LAD stent 100% In stent restenosis, presence of Left-->left collaterals and faint R-->L collaterals  LCx: large caliber vessel, mild luminal irregularities, OM1 large caliber vessel, mild luminal irregularities  RCA: dominant, patent proximal, mid and distal stents    2. PCI  s/p successful IVUS guided PCI of 100% proximal LAD  via dual injection technique. PCI of LAD via antegrade approach using IVUS guidance and successful delivery of 3.5x24 mm Synergy BRIANNA post dilated with 3.25x15 NC Emerge balloon with residual 0% stenosis and MAGDALENA III flow. Post PCI IVUS with MLA mid LAD distal to previously placed stent 5.6 mm^2. Excellent angiographic results     Recommendations  s/p successful IVUS guided PCI of proximal LAD  with BRIANNA x1  cont ASA 81 mg po daily indefinitely and plavix 75 mg po daily x1 year  follow up with Dr. Ceja as outpt    Access: US guided R CFA, perclose, R radial, D stat

## 2020-01-22 VITALS
RESPIRATION RATE: 16 BRPM | HEART RATE: 66 BPM | SYSTOLIC BLOOD PRESSURE: 122 MMHG | OXYGEN SATURATION: 98 % | DIASTOLIC BLOOD PRESSURE: 77 MMHG

## 2020-01-22 LAB
ANION GAP SERPL CALC-SCNC: 12 MMOL/L — SIGNIFICANT CHANGE UP (ref 5–17)
BUN SERPL-MCNC: 12 MG/DL — SIGNIFICANT CHANGE UP (ref 7–23)
CALCIUM SERPL-MCNC: 9.2 MG/DL — SIGNIFICANT CHANGE UP (ref 8.4–10.5)
CHLORIDE SERPL-SCNC: 102 MMOL/L — SIGNIFICANT CHANGE UP (ref 96–108)
CO2 SERPL-SCNC: 24 MMOL/L — SIGNIFICANT CHANGE UP (ref 22–31)
CREAT SERPL-MCNC: 0.9 MG/DL — SIGNIFICANT CHANGE UP (ref 0.5–1.3)
GLUCOSE BLDC GLUCOMTR-MCNC: 108 MG/DL — HIGH (ref 70–99)
GLUCOSE SERPL-MCNC: 111 MG/DL — HIGH (ref 70–99)
HCT VFR BLD CALC: 45.8 % — SIGNIFICANT CHANGE UP (ref 39–50)
HCV AB S/CO SERPL IA: 0.17 S/CO — SIGNIFICANT CHANGE UP
HCV AB SERPL-IMP: SIGNIFICANT CHANGE UP
HGB BLD-MCNC: 14.4 G/DL — SIGNIFICANT CHANGE UP (ref 13–17)
MAGNESIUM SERPL-MCNC: 1.9 MG/DL — SIGNIFICANT CHANGE UP (ref 1.6–2.6)
MCHC RBC-ENTMCNC: 27 PG — SIGNIFICANT CHANGE UP (ref 27–34)
MCHC RBC-ENTMCNC: 31.4 GM/DL — LOW (ref 32–36)
MCV RBC AUTO: 85.9 FL — SIGNIFICANT CHANGE UP (ref 80–100)
NRBC # BLD: 0 /100 WBCS — SIGNIFICANT CHANGE UP (ref 0–0)
PLATELET # BLD AUTO: 217 K/UL — SIGNIFICANT CHANGE UP (ref 150–400)
POTASSIUM SERPL-MCNC: 4.2 MMOL/L — SIGNIFICANT CHANGE UP (ref 3.5–5.3)
POTASSIUM SERPL-SCNC: 4.2 MMOL/L — SIGNIFICANT CHANGE UP (ref 3.5–5.3)
RBC # BLD: 5.33 M/UL — SIGNIFICANT CHANGE UP (ref 4.2–5.8)
RBC # FLD: 13.4 % — SIGNIFICANT CHANGE UP (ref 10.3–14.5)
SODIUM SERPL-SCNC: 138 MMOL/L — SIGNIFICANT CHANGE UP (ref 135–145)
WBC # BLD: 9.77 K/UL — SIGNIFICANT CHANGE UP (ref 3.8–10.5)
WBC # FLD AUTO: 9.77 K/UL — SIGNIFICANT CHANGE UP (ref 3.8–10.5)

## 2020-01-22 PROCEDURE — 99239 HOSP IP/OBS DSCHRG MGMT >30: CPT

## 2020-01-22 PROCEDURE — 93010 ELECTROCARDIOGRAM REPORT: CPT

## 2020-01-22 RX ORDER — METOPROLOL TARTRATE 50 MG
1 TABLET ORAL
Qty: 30 | Refills: 6
Start: 2020-01-22 | End: 2020-08-18

## 2020-01-22 RX ORDER — CLOPIDOGREL BISULFATE 75 MG/1
1 TABLET, FILM COATED ORAL
Qty: 30 | Refills: 11
Start: 2020-01-22 | End: 2021-01-15

## 2020-01-22 RX ORDER — ASPIRIN/CALCIUM CARB/MAGNESIUM 324 MG
1 TABLET ORAL
Qty: 0 | Refills: 0 | DISCHARGE

## 2020-01-22 RX ORDER — CLOPIDOGREL BISULFATE 75 MG/1
1 TABLET, FILM COATED ORAL
Qty: 0 | Refills: 0 | DISCHARGE

## 2020-01-22 RX ORDER — METOPROLOL TARTRATE 50 MG
1 TABLET ORAL
Qty: 0 | Refills: 0 | DISCHARGE

## 2020-01-22 RX ORDER — ASPIRIN/CALCIUM CARB/MAGNESIUM 324 MG
1 TABLET ORAL
Qty: 30 | Refills: 11
Start: 2020-01-22 | End: 2021-01-15

## 2020-01-22 RX ORDER — METOPROLOL TARTRATE 50 MG
50 TABLET ORAL DAILY
Refills: 0 | Status: DISCONTINUED | OUTPATIENT
Start: 2020-01-22 | End: 2020-01-22

## 2020-01-22 RX ADMIN — Medication 81 MILLIGRAM(S): at 11:38

## 2020-01-22 RX ADMIN — PANTOPRAZOLE SODIUM 40 MILLIGRAM(S): 20 TABLET, DELAYED RELEASE ORAL at 05:40

## 2020-01-22 RX ADMIN — Medication 25 MILLIGRAM(S): at 05:41

## 2020-01-22 RX ADMIN — CLOPIDOGREL BISULFATE 75 MILLIGRAM(S): 75 TABLET, FILM COATED ORAL at 11:38

## 2020-01-22 NOTE — DISCHARGE NOTE PROVIDER - NSDCCPCAREPLAN_GEN_ALL_CORE_FT
PRINCIPAL DISCHARGE DIAGNOSIS  Diagnosis: CAD (coronary artery disease)  Assessment and Plan of Treatment: You have a diagnosis of coronary artery disease and received a stent to your Left Anterior Descending coronary artery. You are to continue on Aspirin 81mg daily and Plavix (Clopidogrel) 75mg daily. You MUST continue taking the daily Aspirin and Plavix to ensure your stent does not close. DO NOT STOP THESE MEDICATIONS FOR ANY REASON UNLESS OTHERWISE INDICATED BY YOUR CARDIOLOGIST BECAUSE THIS WILL PUT YOU AT RISK FOR A HEART ATTACK. You should refrain from strenuous activity and heavy lifting for 1 week. Please make a follow up appointment with your cardiologist, Dr. Ceja, within 1-2 weeks of your discharge. All of your prescriptions have been sent electronically to your pharmacy.      SECONDARY DISCHARGE DIAGNOSES  Diagnosis: Diabetes  Assessment and Plan of Treatment: You have a diagnosis of diabetes and should continue all of your diabetic medications as prescribed. Please do not take your metformin for 2 days to prevent interaction with the contrast you recieved.    Diagnosis: HTN (hypertension)  Assessment and Plan of Treatment: You have a history of elevated blood pressure and you should continue your blood pressure medications as prescribed.    Diagnosis: HLD (hyperlipidemia)  Assessment and Plan of Treatment: Please make sure to take Atorvastatin 40mg once daily each night to help lower your cholesterol and reduce your risk of heart disease progression. This is a decreased dose from the Atorvastatin (Lipitor) 80mg that you had been prescribed which caused you to experience some side effects of muscle pain. Please let Dr. Ceja know if you continue to experience any side effects. PRINCIPAL DISCHARGE DIAGNOSIS  Diagnosis: CAD (coronary artery disease)  Assessment and Plan of Treatment: You have a diagnosis of coronary artery disease and received a stent to your Left Anterior Descending coronary artery. You are to continue on Aspirin 81mg daily and Plavix (Clopidogrel) 75mg daily. You MUST continue taking the daily Aspirin and Plavix to ensure your stent does not close. DO NOT STOP THESE MEDICATIONS FOR ANY REASON UNLESS OTHERWISE INDICATED BY YOUR CARDIOLOGIST BECAUSE THIS WILL PUT YOU AT RISK FOR A HEART ATTACK. You should refrain from strenuous activity and heavy lifting for 1 week. Please make a follow up appointment with your cardiologist, Dr. Ceja, within 1-2 weeks of your discharge. All of your prescriptions have been sent electronically to your pharmacy.      SECONDARY DISCHARGE DIAGNOSES  Diagnosis: Ischemic cardiomyopathy  Assessment and Plan of Treatment: You have a history of heart failure  and should weigh yourself daily and if you notice a weight gain of more than 2-3 pounds in 2 days, shortness of breath, or lower extremity swelling you should contact your doctor immediately. Please restrict your fluid intake to 1-2L daily. You are currently not on a diuretic "water pill", continue Lisinopril 5mg daily.  You were switched from Metoprolol Tartrate 25mg twice daily and started on Toprol XL 50mg daily to better help your heart failure.       Diagnosis: Diabetes  Assessment and Plan of Treatment: You have a diagnosis of diabetes and should continue all of your diabetic medications as prescribed. Please do not take your metformin for 2 days to prevent interaction with the contrast you recieved.    Diagnosis: HTN (hypertension)  Assessment and Plan of Treatment: You have a history of elevated blood pressure and you should continue your blood pressure medications as prescribed.  Lisinopril 5mg daily and Toprol XL 50mg daily to better help your heart failure    Diagnosis: HLD (hyperlipidemia)  Assessment and Plan of Treatment: Please make sure to take Atorvastatin 40mg once daily each night to help lower your cholesterol and reduce your risk of heart disease progression. This is a decreased dose from the Atorvastatin (Lipitor) 80mg that you had been prescribed which caused you to experience some side effects of muscle pain. Please let Dr. Ceja know if you continue to experience any side effects.

## 2020-01-22 NOTE — DISCHARGE NOTE PROVIDER - NSDCFUADDAPPT_GEN_ALL_CORE_FT
Please make sure to schedule an appointment to follow up with Dr. Ceja within 1-2 weeks of discharge.

## 2020-01-22 NOTE — DISCHARGE NOTE PROVIDER - PROVIDER TOKENS
FREE:[LAST:[Marcial],FIRST:[Darwin],PHONE:[phone]],FAX:[phone]   [phone]    -],ADDRESS:[[address] [place] [address], Former[facility]],FOLLOWUP:[1 week],ESTABLISHEDPATIENT:[T]]

## 2020-01-22 NOTE — DISCHARGE NOTE PROVIDER - NSDCMRMEDTOKEN_GEN_ALL_CORE_FT
Aspirin Enteric Coated 81 mg oral delayed release tablet: 1 tab(s) orally once a day  atorvastatin 40 mg oral tablet: 1 tab(s) orally once a day  lisinopril 5 mg oral tablet:   metFORMIN 500 mg oral tablet: 1 tab(s) orally once a day  Metoprolol Tartrate 25 mg oral tablet: 1 tab(s) orally 2 times a day  pantoprazole 40 mg oral delayed release tablet: 1 tab(s) orally once a day  Plavix 75 mg oral tablet: 1 tab(s) orally once a day Aspirin Enteric Coated 81 mg oral delayed release tablet: 1 tab(s) orally once a day  atorvastatin 40 mg oral tablet: 1 tab(s) orally once a day  clopidogrel 75 mg oral tablet: 1 tab(s) orally once a day   lisinopril 5 mg oral tablet:   metFORMIN 500 mg oral tablet: 1 tab(s) orally once a day  pantoprazole 40 mg oral delayed release tablet: 1 tab(s) orally once a day  Toprol-XL 50 mg oral tablet, extended release: 1 tab(s) orally once a day

## 2020-01-22 NOTE — DISCHARGE NOTE NURSING/CASE MANAGEMENT/SOCIAL WORK - PATIENT PORTAL LINK FT
You can access the FollowMyHealth Patient Portal offered by [facility] by registering at the following website: [email or web address]. By joining [employer]’s FollowMyHealth portal, you will also be able to view your health information using other applications (apps) compatible with our system.

## 2020-01-22 NOTE — DISCHARGE NOTE PROVIDER - NSDCFUADDINST_GEN_ALL_CORE_FT
You underwent a coronary angiogram and should wait 3 days before returning to ordinary activities. Do not drive for 2 days. Consult your doctor before returning to vigorous activity. You may return to work in 3-5 days. You may shower once but avoid baths, hot tubs, or swimming for 5 days to prevent infection. If you notice bleeding from the site, hardening and pain at the site, drainage or redness from the site, coolness/paleness of the extremity, swelling, or fever, please call [phone].

## 2020-01-22 NOTE — DISCHARGE NOTE PROVIDER - CARE PROVIDER_API CALL
Darwin Ceja  [address] [place], [address]  Phone: [phone]  Fax: (   )    -  Established Patient  Follow Up Time: 1 week

## 2020-01-22 NOTE — DISCHARGE NOTE PROVIDER - HOSPITAL COURSE
58 y/o current marijuana user and former smoker male with a FHx of CAD (father and mother) with PMHx of HTN, HLD, ICM (EF 30-35% by Echo 8/2019), psoriasis, known CAD with prior STEMI s/p [place] 5[place]2018 [place] [facility] [place] 8/25/2019 [place] [facility] [place]/p PCI RCA with residual 100% LAD ISR stenosis with left to left and right to left collaterals for which patient underwent Nuclear Viability Study (8/26/2019) revealing mildly decreased uptake in the anteroseptal region both at rest and stress with no significant improvement but had more than 50% uptake at baseline suggestive of a viability now presents for staged PCI of LAD lesion today. Patient endorses since last PCI, he experiences ANSARI associated with left sided chest discomfort and cramping in bilateral calves when ambulating more than 2-3 city blocks relieved with rest. He denies any PND, orthopnea, dizziness, syncope, cough, N/V, palpitation or recent ER/Hospitalizations. He remains compliant with ASA/Plavix regimen. Prior Echo 08/26/2019 revealed EF 30-35%, regional wall abnormalities involving the septum, apex, and distal inferior walls, mild MR, trace TR.  In light of pt's risk factors, known CAD with residual LAD disease and CCS Angina Class III Symptoms, patient presents for staged PCI of LAD .     Pt is now s/p cardiac cath 1/21/20: BRIANNA IVUS assisted PCI of pLAD (100% ), LM normal, LAD prox 100% , ostial D1 99% calcified subtotal, prior mLAD stent 100% ISR small L to L collateral and faint grade 1 R to L collaterals, LCx large vessel, OM1 luminal irregularities large, RCA dominant, patent prox, mid, and distal stents. EF from previous echo 30-35%    Pt on Lipitor 80 at home but experienced myalgias. Lipitor 40 started per Dr. Ceja.    No significant events on telemetry overnight. Repeat EKG without ischemic changes. Patient has been medically cleared for discharge as per Dr. Chaudhari. Patient has been given appropriate discharge instructions including medication regimen, access site management and follow up. Medications that patient needs refills on have been e-prescribed to preferred pharmacy.         Temp HR BP RR SpO2    Gen: NAD, A&O x3    Cards: RRR, clear S1 and S2 without murmur    Pulm: CTA B/L without w/r/r    Right __: No hematoma or ooze, peripheral pulses 2+ B/L    Abd: soft, NT    Ext: no LE edema or ulcerations B/L 58 y/o current marijuana user and former smoker male with a FHx of CAD (father and mother) with PMHx of HTN, HLD, ICM (EF 30-35% by Echo 8/2019), psoriasis, known CAD with prior STEMI s/p [place] 5[place]1/[place] [facility] [place] [facility] [place] RCA with residual 100% LAD ISR stenosis with left to left and right to left collaterals for which patient underwent Nuclear Viability Study (8/26/2019) revealing mildly decreased uptake in the anteroseptal region both at rest and stress with no significant improvement but had more than 50% uptake at baseline suggestive of a viability now presents for staged PCI of LAD lesion today. Patient endorses since last PCI, he experiences ANSARI associated with left sided chest discomfort and cramping in bilateral calves when ambulating more than 2-3 city blocks relieved with rest. He denies any PND, orthopnea, dizziness, syncope, cough, N/V, palpitation or recent ER/Hospitalizations. He remains compliant with ASA/Plavix regimen. Prior Echo 08/26/2019 revealed EF 30-35%, regional wall abnormalities involving the septum, apex, and distal inferior walls, mild MR, trace TR.  In light of pt's risk factors, known CAD with residual LAD disease and CCS Angina Class III Symptoms, patient presents for staged PCI of LAD .     Pt is now s/p cardiac cath 1/21/20: BRIANNA IVUS assisted PCI of pLAD (100% ), LM normal, LAD prox 100% , ostial D1 99% calcified subtotal, prior mLAD stent 100% ISR small L to L collateral and faint grade 1 R to L collaterals, LCx large vessel, OM1 luminal irregularities large, RCA dominant, patent prox, mid, and distal stents. EF from previous echo 30-35%. Pt on Lipitor 80 at home but experienced myalgias. Lipitor 40 started per Dr. Ceja on 1/30/20. No significant events on telemetry overnight. Repeat EKG without ischemic changes. Patient has been medically cleared for discharge as per Dr. Chaudhari. Patient has been given appropriate discharge instructions including medication regimen, access site management and follow up. Medications that patient needs refills on have been e-prescribed to preferred pharmacy.         Temp HR BP RR SpO2    Gen: NAD, A&O x3    Cards: RRR, clear S1 and S2 without murmur    Pulm: CTA B/L without w/r/r    Right Upper Exremity, no Groin, no hematoma or ooze, peripheral pulses 2+ B/L    Abd: soft, NT    Ext: no LE edema or ulcerations B/L

## 2020-01-22 NOTE — PROGRESS NOTE ADULT - SUBJECTIVE AND OBJECTIVE BOX
INTERVENTIONAL CARDIOLOGY FOLLOW UP NOTE    -Patient seen and examined this am  -No events overnight  -No complaints this am    T(C): 36.9 (01-22-20 @ 13:53), Max: 37.4 (01-21-20 @ 19:41)  HR: 66 (01-22-20 @ 14:04) (66 - 94)  BP: 122/77 (01-22-20 @ 14:04) (94/56 - 130/71)  RR: 16 (01-22-20 @ 14:04) (16 - 18)  SpO2: 98% (01-22-20 @ 14:04) (94% - 98%)    VASCULAR ACCESS EXAM:  FEMORAL:  2+ right femoral pulse  2+ DP/PT pulses.  -Access site clean, non-tender, without ecchymosis or hematoma.  RADIAL:  2+ right radial pulse  -Access site clean, non-tender, without ecchymosis or hematoma.      A/P  S/p PCI of LAD  with BRIANNA via femoral approach with no evidence of vascular complications post procedure.     -continue care as per primary team  -discharge instructions as per protocol  -outpatient follow up with primary cardiologist

## 2020-01-24 DIAGNOSIS — Z87.891 PERSONAL HISTORY OF NICOTINE DEPENDENCE: ICD-10-CM

## 2020-01-24 DIAGNOSIS — I25.10 ATHEROSCLEROTIC HEART DISEASE OF NATIVE CORONARY ARTERY WITHOUT ANGINA PECTORIS: ICD-10-CM

## 2020-01-24 DIAGNOSIS — I25.5 ISCHEMIC CARDIOMYOPATHY: ICD-10-CM
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
## 2020-01-24 DIAGNOSIS — L40.9 PSORIASIS, UNSPECIFIED: ICD-10-CM

## 2020-01-24 DIAGNOSIS — Z79.02 LONG TERM (CURRENT) USE OF ANTITHROMBOTICS/ANTIPLATELETS: ICD-10-CM
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
## 2020-01-24 DIAGNOSIS — I10 ESSENTIAL (PRIMARY) HYPERTENSION: ICD-10-CM

## 2020-01-24 DIAGNOSIS — I25.82 CHRONIC TOTAL OCCLUSION OF CORONARY ARTERY: ICD-10-CM

## 2020-01-24 DIAGNOSIS — I25.2 OLD MYOCARDIAL INFARCTION: ICD-10-CM

## 2020-01-24 DIAGNOSIS — T82.855A STENOSIS OF CORONARY ARTERY STENT, INITIAL ENCOUNTER: ICD-10-CM

## 2020-01-24 DIAGNOSIS — Z95.5 PRESENCE OF CORONARY ANGIOPLASTY IMPLANT AND GRAFT: ICD-10-CM

## 2020-01-24 DIAGNOSIS — F12.99 CANNABIS USE, UNSPECIFIED WITH UNSPECIFIED CANNABIS-INDUCED DISORDER: ICD-10-CM

## 2020-01-24 DIAGNOSIS — E78.5 HYPERLIPIDEMIA, UNSPECIFIED: ICD-10-CM

## 2020-01-24 DIAGNOSIS — Z79.82 LONG TERM (CURRENT) USE OF ASPIRIN: ICD-10-CM

## 2020-01-24 DIAGNOSIS — Z82.49 FAMILY HISTORY OF ISCHEMIC HEART DISEASE AND OTHER DISEASES OF THE CIRCULATORY SYSTEM: ICD-10-CM

## 2020-03-04 PROCEDURE — 82962 GLUCOSE BLOOD TEST: CPT

## 2020-03-04 PROCEDURE — 83036 HEMOGLOBIN GLYCOSYLATED A1C: CPT

## 2020-03-04 PROCEDURE — C1894: CPT

## 2020-03-04 PROCEDURE — C1769: CPT

## 2020-03-04 PROCEDURE — 86140 C-REACTIVE PROTEIN: CPT

## 2020-03-04 PROCEDURE — 85027 COMPLETE CBC AUTOMATED: CPT

## 2020-03-04 PROCEDURE — 80053 COMPREHEN METABOLIC PANEL: CPT

## 2020-03-04 PROCEDURE — 85730 THROMBOPLASTIN TIME PARTIAL: CPT

## 2020-03-04 PROCEDURE — 36415 COLL VENOUS BLD VENIPUNCTURE: CPT

## 2020-03-04 PROCEDURE — 82550 ASSAY OF CK (CPK): CPT

## 2020-03-04 PROCEDURE — C1887: CPT

## 2020-03-04 PROCEDURE — C1874: CPT

## 2020-03-04 PROCEDURE — C1753: CPT

## 2020-03-04 PROCEDURE — 80048 BASIC METABOLIC PNL TOTAL CA: CPT

## 2020-03-04 PROCEDURE — C1760: CPT

## 2020-03-04 PROCEDURE — 85610 PROTHROMBIN TIME: CPT

## 2020-03-04 PROCEDURE — 83735 ASSAY OF MAGNESIUM: CPT

## 2020-03-04 PROCEDURE — 93005 ELECTROCARDIOGRAM TRACING: CPT

## 2020-03-04 PROCEDURE — C1725: CPT

## 2020-03-04 PROCEDURE — 86803 HEPATITIS C AB TEST: CPT

## 2020-03-04 PROCEDURE — 85025 COMPLETE CBC W/AUTO DIFF WBC: CPT

## 2020-03-04 PROCEDURE — 80061 LIPID PANEL: CPT

## 2020-03-04 PROCEDURE — 82553 CREATINE MB FRACTION: CPT

## 2020-06-18 NOTE — PATIENT PROFILE ADULT - NSASFUNCLEVELADLTOILET_GEN_A_NUR
Received request via: Pharmacy    Was the patient seen in the last year in this department? Yes    Does the patient have an active prescription (recently filled or refills available) for medication(s) requested? No   0 = independent

## 2021-04-27 NOTE — ED PROVIDER NOTE - NS ED MD EM SELECTION
[id number] Detailed

## 2023-11-16 NOTE — ED PROVIDER NOTE - DURATION
week(s)/1.5 1 You can access the FollowMyHealth Patient Portal offered by [facility] by registering at the following website: [email or web address]. By joining [employer]’s FollowMyHealth portal, you will also be able to view your health information using other applications (apps) compatible with our system.